# Patient Record
Sex: MALE | Race: WHITE | NOT HISPANIC OR LATINO | Employment: FULL TIME | URBAN - METROPOLITAN AREA
[De-identification: names, ages, dates, MRNs, and addresses within clinical notes are randomized per-mention and may not be internally consistent; named-entity substitution may affect disease eponyms.]

---
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
## 2022-03-16 ENCOUNTER — OFFICE VISIT (OUTPATIENT)
Dept: MEDICAL GROUP | Facility: LAB | Age: 22
End: 2022-03-16
Payer: COMMERCIAL

## 2022-03-16 VITALS
WEIGHT: 132 LBS | DIASTOLIC BLOOD PRESSURE: 72 MMHG | HEIGHT: 67 IN | OXYGEN SATURATION: 96 % | SYSTOLIC BLOOD PRESSURE: 110 MMHG | BODY MASS INDEX: 20.72 KG/M2 | RESPIRATION RATE: 12 BRPM | TEMPERATURE: 98.4 F | HEART RATE: 102 BPM

## 2022-03-16 DIAGNOSIS — Z00.00 ANNUAL PHYSICAL EXAM: ICD-10-CM

## 2022-03-16 DIAGNOSIS — R00.2 PALPITATIONS: ICD-10-CM

## 2022-03-16 DIAGNOSIS — Z71.89 BEREAVEMENT COUNSELING: ICD-10-CM

## 2022-03-16 PROCEDURE — 99395 PREV VISIT EST AGE 18-39: CPT | Performed by: FAMILY MEDICINE

## 2022-03-16 ASSESSMENT — ENCOUNTER SYMPTOMS
VOMITING: 0
NAUSEA: 0
DOUBLE VISION: 0
SHORTNESS OF BREATH: 0
DIARRHEA: 0
CHILLS: 0
FEVER: 0
PALPITATIONS: 1
COUGH: 0
ABDOMINAL PAIN: 0
BLURRED VISION: 0
WHEEZING: 0

## 2022-03-16 ASSESSMENT — PATIENT HEALTH QUESTIONNAIRE - PHQ9: CLINICAL INTERPRETATION OF PHQ2 SCORE: 0

## 2022-03-16 NOTE — PROGRESS NOTES
Subjective:   Ian Meyer is a 21 y.o. male here today for   Chief Complaint   Patient presents with   • Annual Exam     #Annual   -Reviewed all past medical history, family history, social history.  -Reviewed all screening/vaccinations: Due for COVID-19 booster, influenza peer declines at this time.  -Diet and Exercise: Appropriate for age, daily exercise, no concerns  -Tobacco, alcohol, recreational drug use: No changes, see chart  -Sexually active: No changes, see chart    #Palpitations   -Patient has noticed 3 days of palpitations lasting 5 to 6 minutes.  Did have some associated chest pain that resolved with palpitations.  Patient has had increased anxiety recently due to the recent death of his father back in February.  He denies any shortness of breath, lightheadedness, dizziness, syncope, presyncopal episodes.  Is concerned because father  of a heart attack at age 65.    #Bereavement:  -Patient states that he is doing well with the death of his father, recovering well.  States that he is sleeping well, denies any increase in depression.  Does state that he has had episodes of anxiety (which, he thinks, could be related to the palpitations).  Denies any suicidal/homicidal ideations.  Is requesting however referral to counseling for further help.    No Known Allergies      Current medicines (including changes today)  No current outpatient medications on file.     No current facility-administered medications for this visit.     He  has no past medical history on file.    Social History     Socioeconomic History   • Marital status: Single     Spouse name: Not on file   • Number of children: Not on file   • Years of education: Not on file   • Highest education level: Not on file   Occupational History   • Not on file   Tobacco Use   • Smoking status: Never Smoker   • Smokeless tobacco: Never Used   Vaping Use   • Vaping Use: Never used   Substance and Sexual Activity   • Alcohol use: No   • Drug use: No  "  • Sexual activity: Never     ROS   Review of Systems   Constitutional: Negative for chills and fever.   HENT: Negative for hearing loss.    Eyes: Negative for blurred vision and double vision.   Respiratory: Negative for cough, shortness of breath and wheezing.    Cardiovascular: Positive for chest pain and palpitations. Negative for leg swelling.   Gastrointestinal: Negative for abdominal pain, diarrhea, nausea and vomiting.      Objective:     Physical Exam:  /72 (BP Location: Left arm, Patient Position: Sitting, BP Cuff Size: Adult)   Pulse (!) 102   Temp 36.9 °C (98.4 °F)   Resp 12   Ht 1.702 m (5' 7\")   Wt 59.9 kg (132 lb)   SpO2 96%  Body mass index is 20.67 kg/m².   Constitutional: Alert, no distress.  Skin: Warm, dry, good turgor, no rashes in visible areas.  2 benign, nevus appearing lesions located on left side of neck, superior measuring 4 mm, inferior measuring 3 mm.  Both lesions are symmetrical, uniform coloring throughout.  Eye: Equal, round and reactive, conjunctiva clear, lids normal.  ENMT: TM's clear bilaterally, lips without lesions, good dentition, oropharynx clear.  Neck: Trachea midline, no masses, no thyromegaly. No cervical or supraclavicular lymphadenopathy.  Respiratory: Unlabored respiratory effort, lungs clear to auscultation, no wheezes, no rhonchi.  Cardiovascular: Normal S1, S2, no murmur, no edema.  Abdomen: Soft, non-tender, no masses, no hepatosplenomegaly.  Psych: Alert and oriented x3, normal affect and mood.    Assessment and Plan:     1. Annual physical exam  -All questions concerns were answered at this time.  -Vaccinations/screenings needed at this time: Up-to-date, declines influenza vaccine.  -Labs reviewed, no concerns  -Discussed the importance of a healthy, Mediterranean style diet, routine exercise regimen.  -Discussed general safety measures including seatbelts, helmets, avoidance of smoking, sunscreen, hydration.  -Follow-up for general physical exam on " a yearly basis, sooner if needed.  - CBC WITHOUT DIFFERENTIAL; Future  - Comp Metabolic Panel; Future    2. Bereavement counseling  -We will refer patient to psychology to discuss treatment.  At this time he does not fit any criteria for MDD generalized anxiety disorder.  Patient will follow-up as needed.  - Referral to Psychology    3. Palpitations  -Examination is benign, no concerns or red flags at this time.  I do think the palpitations are more related to his current bereavement and acute anxiety due to the passing of his father.  We will check labs including TSH rule out any other causes.  Patient will follow-up if symptoms continue.  If they do continue then we may need to them initiate a larger, more broader heart work-up.  - TSH WITH REFLEX TO FT4; Future      Followup: No follow-ups on file.         PLEASE NOTE: This dictation was created using voice recognition software. I have made every reasonable attempt to correct obvious errors, but I expect that there are errors of grammar and possibly content that I did not discover before finalizing the note.

## 2022-03-17 ENCOUNTER — HOSPITAL ENCOUNTER (OUTPATIENT)
Dept: LAB | Facility: MEDICAL CENTER | Age: 22
End: 2022-03-17
Attending: FAMILY MEDICINE
Payer: COMMERCIAL

## 2022-03-17 DIAGNOSIS — Z00.00 ANNUAL PHYSICAL EXAM: ICD-10-CM

## 2022-03-17 DIAGNOSIS — R00.2 PALPITATIONS: ICD-10-CM

## 2022-03-17 LAB
ALBUMIN SERPL BCP-MCNC: 5.2 G/DL (ref 3.2–4.9)
ALBUMIN/GLOB SERPL: 2.9 G/DL
ALP SERPL-CCNC: 90 U/L (ref 30–99)
ALT SERPL-CCNC: 58 U/L (ref 2–50)
ANION GAP SERPL CALC-SCNC: 14 MMOL/L (ref 7–16)
AST SERPL-CCNC: 33 U/L (ref 12–45)
BILIRUB SERPL-MCNC: 4 MG/DL (ref 0.1–1.5)
BUN SERPL-MCNC: 11 MG/DL (ref 8–22)
CALCIUM SERPL-MCNC: 9.9 MG/DL (ref 8.5–10.5)
CHLORIDE SERPL-SCNC: 100 MMOL/L (ref 96–112)
CO2 SERPL-SCNC: 26 MMOL/L (ref 20–33)
CREAT SERPL-MCNC: 0.95 MG/DL (ref 0.5–1.4)
GFR SERPLBLD CREATININE-BSD FMLA CKD-EPI: 118 ML/MIN/1.73 M 2
GLOBULIN SER CALC-MCNC: 1.8 G/DL (ref 1.9–3.5)
GLUCOSE SERPL-MCNC: 81 MG/DL (ref 65–99)
POTASSIUM SERPL-SCNC: 4.5 MMOL/L (ref 3.6–5.5)
PROT SERPL-MCNC: 7 G/DL (ref 6–8.2)
SODIUM SERPL-SCNC: 140 MMOL/L (ref 135–145)
TSH SERPL DL<=0.005 MIU/L-ACNC: 1.67 UIU/ML (ref 0.38–5.33)

## 2022-03-17 PROCEDURE — 84443 ASSAY THYROID STIM HORMONE: CPT

## 2022-03-17 PROCEDURE — 85027 COMPLETE CBC AUTOMATED: CPT

## 2022-03-17 PROCEDURE — 36415 COLL VENOUS BLD VENIPUNCTURE: CPT

## 2022-03-17 PROCEDURE — 80053 COMPREHEN METABOLIC PANEL: CPT

## 2022-03-18 LAB
ERYTHROCYTE [DISTWIDTH] IN BLOOD BY AUTOMATED COUNT: 38.8 FL (ref 35.9–50)
HCT VFR BLD AUTO: 52.6 % (ref 42–52)
HGB BLD-MCNC: 17.3 G/DL (ref 14–18)
MCH RBC QN AUTO: 30.3 PG (ref 27–33)
MCHC RBC AUTO-ENTMCNC: 32.9 G/DL (ref 33.7–35.3)
MCV RBC AUTO: 92.1 FL (ref 81.4–97.8)
PLATELET # BLD AUTO: 256 K/UL (ref 164–446)
PMV BLD AUTO: 10.2 FL (ref 9–12.9)
RBC # BLD AUTO: 5.71 M/UL (ref 4.7–6.1)
WBC # BLD AUTO: 4.5 K/UL (ref 4.8–10.8)

## 2022-03-28 ENCOUNTER — OFFICE VISIT (OUTPATIENT)
Dept: MEDICAL GROUP | Facility: LAB | Age: 22
End: 2022-03-28
Payer: COMMERCIAL

## 2022-03-28 VITALS
DIASTOLIC BLOOD PRESSURE: 70 MMHG | BODY MASS INDEX: 20.72 KG/M2 | HEART RATE: 92 BPM | HEIGHT: 67 IN | TEMPERATURE: 98.2 F | SYSTOLIC BLOOD PRESSURE: 112 MMHG | WEIGHT: 132 LBS | OXYGEN SATURATION: 97 %

## 2022-03-28 DIAGNOSIS — R22.2 MASS ON BACK: ICD-10-CM

## 2022-03-28 DIAGNOSIS — R74.8 ELEVATED LIVER ENZYMES: ICD-10-CM

## 2022-03-28 DIAGNOSIS — R17 ELEVATED BILIRUBIN: ICD-10-CM

## 2022-03-28 PROCEDURE — 99213 OFFICE O/P EST LOW 20 MIN: CPT | Performed by: FAMILY MEDICINE

## 2022-03-28 ASSESSMENT — ENCOUNTER SYMPTOMS
VOMITING: 0
CHILLS: 0
WHEEZING: 0
HEADACHES: 0
BLOOD IN STOOL: 0
CONSTIPATION: 0
NAUSEA: 0
PALPITATIONS: 0
SHORTNESS OF BREATH: 0
ABDOMINAL PAIN: 0
BLURRED VISION: 0
DIARRHEA: 0
DIZZINESS: 0
FEVER: 0

## 2022-03-28 ASSESSMENT — FIBROSIS 4 INDEX: FIB4 SCORE: 0.32

## 2022-03-28 NOTE — PROGRESS NOTES
"Subjective:   Ian Meyer is a 21 y.o. male here today for   Chief Complaint   Patient presents with   • Lab Results   • Bump     X on back, unsure if acne, there is one spot of concern for patient        #Mass on back:  -Patient states for the last several years he has noticed a palpable, nonmobile, nontender mass on his right shoulder blade on the back.  He is unsure of when it appeared, denies any trauma.  He states that it has not changed, gotten bigger, or gotten smaller.  Here today for evaluation.    #Elevated liver enzymes:  -Recent labs completed does show an elevated bilirubin, elevated ALT.  Patient is asymptomatic at this time.  No history of liver disease.  Here today for further evaluation.      No Known Allergies      Current medicines (including changes today)  No current outpatient medications on file.     No current facility-administered medications for this visit.     He  has no past medical history on file.    ROS   Review of Systems   Constitutional: Negative for chills and fever.   HENT: Negative for hearing loss.    Eyes: Negative for blurred vision.   Respiratory: Negative for shortness of breath and wheezing.    Cardiovascular: Negative for chest pain and palpitations.   Gastrointestinal: Negative for abdominal pain, blood in stool, constipation, diarrhea, melena, nausea and vomiting.   Skin: Negative for itching and rash.   Neurological: Negative for dizziness and headaches.          Objective:     Physical Exam:  /70   Pulse 92   Temp 36.8 °C (98.2 °F) (Temporal)   Ht 1.702 m (5' 7.01\")   Wt 59.9 kg (132 lb)   SpO2 97%  Body mass index is 20.67 kg/m².   Constitutional: Alert, no distress.  Skin: Warm, dry, good turgor.  Erythematous mass measuring 1.5 cm x 0.5 cm located on the back at the level of the left scapula.  It is nonmobile, nontender.  Confluent coloration throughout, oblong, asymmetric.  Eye: Equal, round and reactive, conjunctiva clear, lids normal.  Respiratory: " Unlabored respiratory effort  Psych: Alert and oriented x3, normal affect and mood.    Assessment and Plan:     1. Mass on back  -Patient does have a history of acne.  This does appear to be a keloid in shape.  This could have been from previous acne scarring.  No red flag findings of mass at this time.  We will continue with observation.  If any changes the patient will follow-up with me at that time.    2. Elevated liver enzymes  -Uncertain etiology of elevated liver enzymes as well as elevated bilirubin.  Discussed continuation of appropriate diet, avoidance of alcohol.  Discussed strict return precautions.  We will recheck labs again in 1 month.  If there is no resolution at that time we may need to consider further evaluation with ultrasound or other imaging.  - Comp Metabolic Panel; Future    3. Elevated bilirubin  -See #2.  - Comp Metabolic Panel; Future        Followup: No follow-ups on file.         PLEASE NOTE: This dictation was created using voice recognition software. I have made every reasonable attempt to correct obvious errors, but I expect that there are errors of grammar and possibly content that I did not discover before finalizing the note.

## 2024-01-17 NOTE — TELEPHONE ENCOUNTER
Pt's mother called stating that there was supposed to be an acne medication that needed to be sent to the pharmacy.    [Follow-Up] : a follow-up visit [Asthma] : asthma [Pulmonary Hypertension] : pulmonary hypertension